# Patient Record
Sex: MALE | Employment: FULL TIME | URBAN - METROPOLITAN AREA
[De-identification: names, ages, dates, MRNs, and addresses within clinical notes are randomized per-mention and may not be internally consistent; named-entity substitution may affect disease eponyms.]

---

## 2017-03-11 ENCOUNTER — HOSPITAL ENCOUNTER (OUTPATIENT)
Dept: MRI IMAGING | Facility: HOSPITAL | Age: 27
Discharge: HOME | End: 2017-03-11
Attending: INTERNAL MEDICINE
Payer: COMMERCIAL

## 2017-03-11 DIAGNOSIS — R52 SHOOTING PAIN: ICD-10-CM

## 2017-03-11 DIAGNOSIS — M54.2 CHRONIC NECK PAIN: ICD-10-CM

## 2017-03-11 DIAGNOSIS — G89.29 CHRONIC NECK PAIN: ICD-10-CM

## 2017-03-11 DIAGNOSIS — M54.10 RADICULOPATHY AFFECTING UPPER EXTREMITY: ICD-10-CM

## 2017-03-11 PROCEDURE — 72141 MRI NECK SPINE W/O DYE: CPT

## 2017-11-08 ENCOUNTER — OFFICE VISIT (OUTPATIENT)
Dept: INTERNAL MEDICINE CLINIC | Facility: CLINIC | Age: 27
End: 2017-11-08

## 2017-11-08 VITALS
HEIGHT: 68 IN | SYSTOLIC BLOOD PRESSURE: 120 MMHG | WEIGHT: 225 LBS | HEART RATE: 63 BPM | DIASTOLIC BLOOD PRESSURE: 70 MMHG | OXYGEN SATURATION: 98 % | BODY MASS INDEX: 34.1 KG/M2

## 2017-11-08 DIAGNOSIS — Z00.00 ROUTINE PHYSICAL EXAMINATION: Primary | ICD-10-CM

## 2017-11-08 PROCEDURE — 90715 TDAP VACCINE 7 YRS/> IM: CPT | Performed by: INTERNAL MEDICINE

## 2017-11-08 PROCEDURE — 90471 IMMUNIZATION ADMIN: CPT | Performed by: INTERNAL MEDICINE

## 2017-11-08 PROCEDURE — 99395 PREV VISIT EST AGE 18-39: CPT | Performed by: INTERNAL MEDICINE

## 2017-11-08 NOTE — PROGRESS NOTES
Ju Mehta is a 28year old male who presents for a complete physical exam.   HPI:       Wt Readings from Last 6 Encounters:  11/08/17 : 225 lb  12/20/16 : 227 lb  07/25/16 : 210 lb  06/07/16 : 214 lb  09/15/15 : 210 lb  06/15/15 : 209 lb    Body mas nourished,in no apparent distress  SKIN: no rashes,no suspicious lesions  HEENT: atraumatic, normocephalic,ears and throat are clear  EYES:PERRLA, EOMI,conjunctiva are clear, no scleral icterus  NECK: supple,no adenopathy,no bruits, no TM  CHEST: no chest

## 2017-11-18 ENCOUNTER — LAB ENCOUNTER (OUTPATIENT)
Dept: LAB | Facility: HOSPITAL | Age: 27
End: 2017-11-18
Attending: INTERNAL MEDICINE
Payer: COMMERCIAL

## 2017-11-18 DIAGNOSIS — Z00.00 ROUTINE PHYSICAL EXAMINATION: ICD-10-CM

## 2017-11-18 PROCEDURE — 80061 LIPID PANEL: CPT

## 2017-11-18 PROCEDURE — 80053 COMPREHEN METABOLIC PANEL: CPT

## 2017-11-18 PROCEDURE — 84443 ASSAY THYROID STIM HORMONE: CPT

## 2017-11-18 PROCEDURE — 87389 HIV-1 AG W/HIV-1&-2 AB AG IA: CPT

## 2017-11-18 PROCEDURE — 36415 COLL VENOUS BLD VENIPUNCTURE: CPT

## 2017-11-18 PROCEDURE — 83036 HEMOGLOBIN GLYCOSYLATED A1C: CPT

## 2017-11-18 PROCEDURE — 85025 COMPLETE CBC W/AUTO DIFF WBC: CPT

## 2018-02-13 ENCOUNTER — TELEPHONE (OUTPATIENT)
Dept: INTERNAL MEDICINE CLINIC | Facility: CLINIC | Age: 28
End: 2018-02-13

## 2018-02-13 NOTE — TELEPHONE ENCOUNTER
Last A1c 11/18/17, asking if you want A1c before his appt this Thursday 2/15/18 - no lab on file. It will be slightly less than 3 months since last A1c.

## 2018-02-14 NOTE — TELEPHONE ENCOUNTER
Left detailed message on pt's voice mail re: pt is overdue for liver lab tests, no need to do A1c now. Pt asked to call back re: why has he scheduled F/U appt--is he having new symptoms?

## 2018-02-14 NOTE — TELEPHONE ENCOUNTER
Pt states he made the appt tomorrow to discuss concerns with elevated ALT and A1C. Pt cancelled appt for tomorrow as he wants to wait for the results of the Liver Enzyme tests to come back. He will call back to reschedule. Informed pt.  A1C is not needed pe

## 2018-02-15 ENCOUNTER — LAB ENCOUNTER (OUTPATIENT)
Dept: LAB | Age: 28
End: 2018-02-15
Attending: INTERNAL MEDICINE
Payer: COMMERCIAL

## 2018-02-15 DIAGNOSIS — R74.8 ELEVATED LIVER ENZYMES: ICD-10-CM

## 2018-02-15 DIAGNOSIS — R69 DIAGNOSIS UNKNOWN: Primary | ICD-10-CM

## 2018-02-15 PROCEDURE — 36415 COLL VENOUS BLD VENIPUNCTURE: CPT

## 2018-02-15 PROCEDURE — 84450 TRANSFERASE (AST) (SGOT): CPT

## 2018-02-15 PROCEDURE — 84460 ALANINE AMINO (ALT) (SGPT): CPT

## 2018-02-15 NOTE — TELEPHONE ENCOUNTER
Pt would like to know if he can have an A1C added to his liver enzyme test, even though it's early. He would just like to see if it's come down slightly since changing his diet and exercise regimen. Is this okay? Thank you.

## 2018-02-15 NOTE — TELEPHONE ENCOUNTER
Patient called again, stating his parents are very concerned about his A1C and wants ot know if Dr. Aylin Rojas would order that lab to [facility]. Please advise patient, thank you.

## 2018-02-16 LAB
ALT SERPL-CCNC: 57 U/L (ref 17–63)
AST SERPL-CCNC: 27 U/L (ref 15–41)

## 2018-03-15 ENCOUNTER — TELEPHONE (OUTPATIENT)
Dept: INTERNAL MEDICINE CLINIC | Facility: CLINIC | Age: 28
End: 2018-03-15

## 2018-03-15 DIAGNOSIS — R73.9 HYPERGLYCEMIA: Primary | ICD-10-CM

## 2018-03-30 ENCOUNTER — APPOINTMENT (OUTPATIENT)
Dept: LAB | Age: 28
End: 2018-03-30
Attending: INTERNAL MEDICINE
Payer: COMMERCIAL

## 2018-03-30 DIAGNOSIS — R73.9 HYPERGLYCEMIA: ICD-10-CM

## 2018-03-30 PROCEDURE — 36415 COLL VENOUS BLD VENIPUNCTURE: CPT

## 2018-03-30 PROCEDURE — 83036 HEMOGLOBIN GLYCOSYLATED A1C: CPT

## 2018-04-04 ENCOUNTER — TELEPHONE (OUTPATIENT)
Dept: INTERNAL MEDICINE CLINIC | Facility: CLINIC | Age: 28
End: 2018-04-04

## 2018-04-04 NOTE — TELEPHONE ENCOUNTER
Spoke with pt. See Lab 3/30/18. Advised per Dr. Philippe Agent that pt needs to reduce carbohydrates in his diet. Pt voiced understanding.

## 2018-04-04 NOTE — TELEPHONE ENCOUNTER
Patient reviewed labs ordered by Dr Gaylene Galeazzi which indicated that he is pre-diabetic. Patient is asking about recommendations for improving this. Please advise.

## 2018-05-11 ENCOUNTER — TELEPHONE (OUTPATIENT)
Dept: INTERNAL MEDICINE CLINIC | Facility: CLINIC | Age: 28
End: 2018-05-11

## 2018-05-11 NOTE — TELEPHONE ENCOUNTER
Patient called, he said that his BP was high last night, he said it was 130's/94 patient said that the 94 stayed high for a while, then finally dropped after the upper number dropped.  Patients hands were very shaky, patient said this happened around 11-7 o'

## 2018-05-11 NOTE — TELEPHONE ENCOUNTER
Called the patient back. He stated that yesterday evening he started to feel shaky and sweaty, which prompted him to take his BP. The patient stated his systolic BP was in the 732 range, and his diastolic BP was around 94.  The patient stated he waited to t

## 2018-05-12 ENCOUNTER — OFFICE VISIT (OUTPATIENT)
Dept: INTERNAL MEDICINE CLINIC | Facility: CLINIC | Age: 28
End: 2018-05-12

## 2018-05-12 VITALS
WEIGHT: 217 LBS | DIASTOLIC BLOOD PRESSURE: 60 MMHG | SYSTOLIC BLOOD PRESSURE: 90 MMHG | HEART RATE: 68 BPM | BODY MASS INDEX: 35.29 KG/M2 | RESPIRATION RATE: 14 BRPM | HEIGHT: 65.75 IN | TEMPERATURE: 98 F | OXYGEN SATURATION: 99 %

## 2018-05-12 DIAGNOSIS — R25.1 TREMULOUSNESS: Primary | ICD-10-CM

## 2018-05-12 DIAGNOSIS — Z71.1 CONCERN ABOUT STD IN MALE WITHOUT DIAGNOSIS: ICD-10-CM

## 2018-05-12 PROCEDURE — 99214 OFFICE O/P EST MOD 30 MIN: CPT | Performed by: INTERNAL MEDICINE

## 2018-05-12 RX ORDER — ELECTROLYTES/DEXTROSE
SOLUTION, ORAL ORAL DAILY
COMMUNITY

## 2018-05-12 NOTE — PROGRESS NOTES
Zay Ortega is a 28year old male.   HPI:   CC: Thursday night  Sweaty and shaky right hand  20 min at the same time 830 pm or 9 pm  BP was 130 range, HR in 90's  Calm down afterward taking shower  Eating helped  Had small sandwich for lunch but did no SpO2 99%   BMI 35.29 kg/m²   GENERAL: well developed, well nourished,in no apparent distress  NECK: supple,no masses, no thyromegaly, no thyroid nodules  LYMPH: no cervical adenopathy  CARDIO: [id number] no S3S4 no murmur, PMI nondisplaced,  no carotid bruit

## 2023-02-16 ENCOUNTER — APPOINTMENT (OUTPATIENT)
Dept: URBAN - METROPOLITAN AREA CLINIC 248 | Age: 33
Setting detail: DERMATOLOGY
End: 2023-02-20

## 2023-02-16 DIAGNOSIS — L40.0 PSORIASIS VULGARIS: ICD-10-CM

## 2023-02-16 PROCEDURE — OTHER ADDITIONAL NOTES: OTHER

## 2023-02-16 PROCEDURE — OTHER PRESCRIPTION MEDICATION MANAGEMENT: OTHER

## 2023-02-16 PROCEDURE — OTHER PRESCRIPTION: OTHER

## 2023-02-16 PROCEDURE — 99203 OFFICE O/P NEW LOW 30 MIN: CPT

## 2023-02-16 PROCEDURE — OTHER COUNSELING: OTHER

## 2023-02-16 PROCEDURE — OTHER MEDICATION COUNSELING: OTHER

## 2023-02-16 RX ORDER — CLOBETASOL PROPIONATE 0.5 MG/G
CREAM TOPICAL BID
Qty: 60 | Refills: 3 | Status: ERX | COMMUNITY
Start: 2023-02-16

## 2023-02-16 RX ORDER — KETOCONAZOLE 20 MG/ML
SHAMPOO, SUSPENSION TOPICAL
Qty: 120 | Refills: 3 | Status: ERX | COMMUNITY
Start: 2023-02-16

## 2023-02-16 RX ORDER — DESONIDE 0.5 MG/G
CREAM TOPICAL
Qty: 60 | Refills: 3 | Status: ERX | COMMUNITY
Start: 2023-02-16

## 2023-02-16 RX ORDER — CALCIPOTRIENE 50 UG/G
AEROSOL, FOAM TOPICAL
Qty: 60 | Refills: 3 | Status: ERX | COMMUNITY
Start: 2023-02-16

## 2023-02-16 ASSESSMENT — LOCATION ZONE DERM
LOCATION ZONE: TRUNK
LOCATION ZONE: SCALP
LOCATION ZONE: NECK
LOCATION ZONE: FACE

## 2023-02-16 ASSESSMENT — LOCATION SIMPLE DESCRIPTION DERM
LOCATION SIMPLE: RIGHT BUTTOCK
LOCATION SIMPLE: RIGHT FOREHEAD
LOCATION SIMPLE: LEFT ANTERIOR NECK
LOCATION SIMPLE: SCALP
LOCATION SIMPLE: POSTERIOR SCALP
LOCATION SIMPLE: LEFT UPPER BACK

## 2023-02-16 ASSESSMENT — LOCATION DETAILED DESCRIPTION DERM
LOCATION DETAILED: LEFT CENTRAL FRONTAL SCALP
LOCATION DETAILED: RIGHT CENTRAL FRONTAL SCALP
LOCATION DETAILED: LEFT LATERAL UPPER BACK
LOCATION DETAILED: RIGHT OCCIPITAL SCALP
LOCATION DETAILED: RIGHT SUPERIOR FOREHEAD
LOCATION DETAILED: RIGHT BUTTOCK
LOCATION DETAILED: LEFT INFERIOR LATERAL NECK
LOCATION DETAILED: LEFT OCCIPITAL SCALP

## 2023-02-16 ASSESSMENT — BSA PSORIASIS: % BODY COVERED IN PSORIASIS: 6

## 2023-02-16 NOTE — PROCEDURE: PRESCRIPTION MEDICATION MANAGEMENT
Render In Strict Bullet Format?: No
Initiate Treatment: ketoconazole 2 % shampoo \\nQuantity: 120.0 ml  Days Supply: 30\\nSig: Wash into scalp every other day when you wash your hair\\n\\ncalcipotriene 0.005 % topical foam \\nQuantity: 60.0 g  Days Supply: 30\\nSig: Apply 1-2 times daily, take weekends off. Alternate with clobetasol\\n\\ndesonide 0.05 % topical cream \\nQuantity: 60.0 g  Days Supply: 30\\nSig: Apply to affected areas on groin 1-2 times daily, take weekends off\\n\\nclobetasol 0.05 % topical cream BID\\nQuantity: 60.0 g  Days Supply: 30\\nSig: Apply twice daily to affected areas take  weekends off
Detail Level: Zone

## 2023-02-16 NOTE — PROCEDURE: ADDITIONAL NOTES
Detail Level: Simple
Additional Notes: Pt provided with skyrizi informational pamphlet, will consider starting if topical regimen doesn’t work for him
Render Risk Assessment In Note?: no

## 2023-02-16 NOTE — PROCEDURE: MEDICATION COUNSELING
Xelivaz Pregnancy And Lactation Text: This medication is Pregnancy Category D and is not considered safe during pregnancy.  The risk during breast feeding is also uncertain.

## 2023-02-23 ENCOUNTER — RX ONLY (RX ONLY)
Age: 33
End: 2023-02-23

## 2023-02-23 RX ORDER — CALCIPOTRIENE 0.05 MG/G
CREAM TOPICAL
Qty: 120 | Refills: 3 | Status: ERX | COMMUNITY
Start: 2023-02-23

## 2023-07-20 ENCOUNTER — APPOINTMENT (OUTPATIENT)
Dept: URBAN - METROPOLITAN AREA CLINIC 248 | Age: 33
Setting detail: DERMATOLOGY
End: 2023-07-20

## 2023-07-20 DIAGNOSIS — L40.0 PSORIASIS VULGARIS: ICD-10-CM

## 2023-07-20 PROCEDURE — 99213 OFFICE O/P EST LOW 20 MIN: CPT

## 2023-07-20 PROCEDURE — OTHER PRESCRIPTION: OTHER

## 2023-07-20 PROCEDURE — OTHER COUNSELING: OTHER

## 2023-07-20 PROCEDURE — OTHER PRESCRIPTION MEDICATION MANAGEMENT: OTHER

## 2023-07-20 PROCEDURE — OTHER ADDITIONAL NOTES: OTHER

## 2023-07-20 RX ORDER — CLOBETASOL PROPIONATE 0.05 G/100ML
SHAMPOO TOPICAL
Qty: 118 | Refills: 3 | Status: ERX | COMMUNITY
Start: 2023-07-20

## 2023-07-20 NOTE — PROCEDURE: PRESCRIPTION MEDICATION MANAGEMENT
Detail Level: Zone
Render In Strict Bullet Format?: No
Discontinue Regimen: calcipotriene .005 topical foam
Continue Regimen: Ketoconazole shampoo 2% shampoo. desonide .05 cream, and clobetasol.05% cream
Initiate Treatment: Clobex 0.05 % shampoo \\nSig: Apply on scalp daily.

## 2023-07-20 NOTE — PROCEDURE: ADDITIONAL NOTES
Render Risk Assessment In Note?: no
Additional Notes: Declines skyrizi at this time
Detail Level: Simple
Additional Notes: Referred to Dr. Fernandez Musa for joint pain

## 2023-07-20 NOTE — HPI: FOLLOW-UP
During The Previous Visit, The Patient ....: Was prescribed ketoconazole 2% shampoo, calcipotriene .005 topical foam, desonide .05 cream, and clobetasol.05% cream

## 2023-10-31 ENCOUNTER — APPOINTMENT (OUTPATIENT)
Dept: URBAN - METROPOLITAN AREA CLINIC 248 | Age: 33
Setting detail: DERMATOLOGY
End: 2023-10-31

## 2023-10-31 DIAGNOSIS — L40.0 PSORIASIS VULGARIS: ICD-10-CM

## 2023-10-31 PROCEDURE — OTHER COUNSELING: OTHER

## 2023-10-31 PROCEDURE — OTHER PRESCRIPTION MEDICATION MANAGEMENT: OTHER

## 2023-10-31 PROCEDURE — OTHER PRESCRIPTION: OTHER

## 2023-10-31 PROCEDURE — OTHER ADDITIONAL NOTES: OTHER

## 2023-10-31 PROCEDURE — 99213 OFFICE O/P EST LOW 20 MIN: CPT

## 2023-10-31 RX ORDER — FLUOCINOLONE ACETONIDE 0.1 MG/ML
SOLUTION TOPICAL
Qty: 90 | Refills: 3 | Status: ERX | COMMUNITY
Start: 2023-10-31

## 2023-10-31 ASSESSMENT — LOCATION DETAILED DESCRIPTION DERM: LOCATION DETAILED: EPIGASTRIC SKIN

## 2023-10-31 ASSESSMENT — LOCATION ZONE DERM: LOCATION ZONE: TRUNK

## 2023-10-31 ASSESSMENT — LOCATION SIMPLE DESCRIPTION DERM: LOCATION SIMPLE: ABDOMEN

## 2023-10-31 NOTE — PROCEDURE: ADDITIONAL NOTES
Render Risk Assessment In Note?: no
Additional Notes: Declines skyrizi at this time
Detail Level: Simple
Additional Notes: Saw Dr. Fernandez Musa for joint pain, to consider biologic therapy

## 2024-02-15 ENCOUNTER — RX ONLY (RX ONLY)
Age: 34
End: 2024-02-15

## 2024-02-15 ENCOUNTER — APPOINTMENT (OUTPATIENT)
Dept: URBAN - METROPOLITAN AREA CLINIC 248 | Age: 34
Setting detail: DERMATOLOGY
End: 2024-02-16

## 2024-02-15 DIAGNOSIS — L40.0 PSORIASIS VULGARIS: ICD-10-CM

## 2024-02-15 PROCEDURE — OTHER PRESCRIPTION MEDICATION MANAGEMENT: OTHER

## 2024-02-15 PROCEDURE — OTHER ADDITIONAL NOTES: OTHER

## 2024-02-15 PROCEDURE — OTHER PRESCRIPTION: OTHER

## 2024-02-15 PROCEDURE — OTHER COUNSELING: OTHER

## 2024-02-15 PROCEDURE — 99213 OFFICE O/P EST LOW 20 MIN: CPT

## 2024-02-15 RX ORDER — DESONIDE 0.5 MG/G
CREAM TOPICAL
Qty: 60 | Refills: 3 | Status: ERX | COMMUNITY
Start: 2024-02-15

## 2024-02-15 RX ORDER — CLOBETASOL PROPIONATE 0.5 MG/G
CREAM TOPICAL BID
Qty: 60 | Refills: 3 | Status: ERX | COMMUNITY
Start: 2024-02-15

## 2024-02-15 RX ORDER — CLOBETASOL PROPIONATE 0.5 MG/ML
SOLUTION TOPICAL
Qty: 50 | Refills: 3 | Status: ERX | COMMUNITY
Start: 2024-02-15

## 2024-02-15 RX ORDER — CALCIPOTRIENE 0.05 MG/G
CREAM TOPICAL
Qty: 120 | Refills: 3 | Status: ERX | COMMUNITY
Start: 2024-02-15

## 2024-02-15 NOTE — PROCEDURE: ADDITIONAL NOTES
Additional Notes: We discussed that psoriatic arthritis can be progressive and damages may be irreversible.  As such I formally recommend a systemic therapy that would address joints.  For now, he is not interested but voiced understanding.
Detail Level: Simple
Render Risk Assessment In Note?: no

## 2024-02-15 NOTE — PROCEDURE: PRESCRIPTION MEDICATION MANAGEMENT
Detail Level: Zone
Render In Strict Bullet Format?: No
Discontinue Regimen: calcipotriene .005 topical foam\\n\\nClobex shampoo
Continue Regimen: Ketoconazole shampoo 2% shampoo. desonide .05 cream, and clobetasol.05% cream\\n\\nSynalar 0.01 % topical solution \\nSig: Apply twice daily to scalp with itch as needed
Initiate Treatment: clobetasol 0.05 % scalp solution \\nSi application daily as needed with flares

## 2024-10-15 ENCOUNTER — APPOINTMENT (OUTPATIENT)
Dept: URBAN - METROPOLITAN AREA CLINIC 248 | Age: 34
Setting detail: DERMATOLOGY
End: 2024-10-17

## 2024-10-15 DIAGNOSIS — L40.0 PSORIASIS VULGARIS: ICD-10-CM

## 2024-10-15 PROCEDURE — OTHER PRESCRIPTION: OTHER

## 2024-10-15 PROCEDURE — 99213 OFFICE O/P EST LOW 20 MIN: CPT

## 2024-10-15 PROCEDURE — OTHER ADDITIONAL NOTES: OTHER

## 2024-10-15 PROCEDURE — OTHER COUNSELING: OTHER

## 2024-10-15 PROCEDURE — OTHER PRESCRIPTION MEDICATION MANAGEMENT: OTHER

## 2024-10-15 PROCEDURE — OTHER MIPS QUALITY: OTHER

## 2024-10-15 RX ORDER — HYDROCORTISONE 25 MG/G
OINTMENT TOPICAL
Qty: 28.35 | Refills: 2 | Status: ERX | COMMUNITY
Start: 2024-10-15

## 2024-10-15 ASSESSMENT — LOCATION SIMPLE DESCRIPTION DERM
LOCATION SIMPLE: RIGHT SCALP
LOCATION SIMPLE: RIGHT UPPER ARM
LOCATION SIMPLE: RIGHT BUTTOCK
LOCATION SIMPLE: LEFT THIGH
LOCATION SIMPLE: RIGHT THIGH
LOCATION SIMPLE: LEFT UPPER ARM
LOCATION SIMPLE: LEFT BUTTOCK

## 2024-10-15 ASSESSMENT — LOCATION ZONE DERM
LOCATION ZONE: ARM
LOCATION ZONE: LEG
LOCATION ZONE: SCALP
LOCATION ZONE: TRUNK

## 2024-10-15 ASSESSMENT — LOCATION DETAILED DESCRIPTION DERM
LOCATION DETAILED: LEFT BUTTOCK
LOCATION DETAILED: RIGHT BUTTOCK
LOCATION DETAILED: LEFT DISTAL POSTERIOR UPPER ARM
LOCATION DETAILED: RIGHT DISTAL POSTERIOR UPPER ARM
LOCATION DETAILED: RIGHT MEDIAL FRONTAL SCALP
LOCATION DETAILED: RIGHT ANTERIOR PROXIMAL THIGH
LOCATION DETAILED: LEFT ANTERIOR PROXIMAL THIGH

## 2024-10-15 ASSESSMENT — BSA PSORIASIS: % BODY COVERED IN PSORIASIS: 20

## 2024-10-15 NOTE — PROCEDURE: PRESCRIPTION MEDICATION MANAGEMENT
Detail Level: Zone
Render In Strict Bullet Format?: No
Discontinue Regimen: calcipotriene .005 topical foam\\n\\nClobex shampoo
Continue Regimen: Ketoconazole shampoo 2% shampoo. desonide .05 cream, and clobetasol.05% cream\\n\\nSynalar 0.01 % topical solution \\nSig: Apply twice daily to scalp with itch as needed\\n\\nclobetasol 0.05 % scalp solution \\nSi application daily as needed with flares
Initiate Treatment: hydrocortisone 2.5 % topical ointment Every 4 days\\nSig: Apply to AA every other day to upper thigh

## 2024-10-15 NOTE — PROCEDURE: ADDITIONAL NOTES
Additional Notes: We discussed that psoriatic arthritis can be progressive and damages may be irreversible.  As such I formally recommend a systemic therapy that would address joints.  For now, he is not interested but voiced understanding. Patient was given information for both skyrizi and tremfya
Detail Level: Simple
Render Risk Assessment In Note?: no

## 2025-01-22 NOTE — PROCEDURE: MEDICATION COUNSELING
Chief Complaint   Patient presents with    Surgical Followup    Office Visit       History of Present Illness:     Status post phaco with posterior chamber intraocular lens implant left eye 12/17/2024.  The patient states vision out of the left eye is pretty decent.  The vision out of the left eye might change a little throughout the day.  The patient does need a new glasses prescription today.  He does see floaters in the left eye.     No eye pain, redness, irritation, dryness, burning, tearing, itching, discharge, photophobia, photopsia,      No diplopia, numbness, tingling, seizures, lightheadedness, dizziness, headache.    The patient requests a refraction and is aware of the clinic fee.     Eye Medications:  None    Past Ocular History:   Age related nuclear cataract right eye, toric pseudophakia left eye 12/17/2024  Left retinal detachment, post PPV, AFX, Gas 10/4/2023 (Dr. Kim)  Bilateral RK 1990s    Family Ocular History:  Reviewed; see chart    Medications, Allergies: Reviewed; see chart    Past Medical, Social, and Family History: Reviewed; see chart    Examination: For full examination and ancillary testing details, see chart    Assessment:    1. Pseudophakia of left eye    2. Myopia with astigmatism and presbyopia, left    3. History of radial keratotomy        Plan:  Doing well post cataract extraction and intraocular lens placement, with resolved inflammation off drops, and clear and well positioned intraocular lens. High powered toric IOL has greatly reduced astigmatism--good refractive result. Refracted as noted in Epic. A glasses prescription was given to the patient on request. We discussed retinal detachment and retinal tear signs and symptoms, and he knows to call for any new concerns.       Follow-up:  Return for follow up as needed (go for a dilated eye exam with your OD in about 6 months).     Ilumya Pregnancy And Lactation Text: The risk during pregnancy and breastfeeding is uncertain with this medication.

## 2025-01-28 ENCOUNTER — APPOINTMENT (OUTPATIENT)
Dept: URBAN - METROPOLITAN AREA CLINIC 248 | Age: 35
Setting detail: DERMATOLOGY
End: 2025-01-30

## 2025-01-28 DIAGNOSIS — L40.0 PSORIASIS VULGARIS: ICD-10-CM

## 2025-01-28 PROCEDURE — OTHER COUNSELING: OTHER

## 2025-01-28 PROCEDURE — OTHER ADDITIONAL NOTES: OTHER

## 2025-01-28 PROCEDURE — OTHER SKYRIZI INITIATION: OTHER

## 2025-01-28 PROCEDURE — OTHER ORDER TESTS: OTHER

## 2025-01-28 PROCEDURE — 99214 OFFICE O/P EST MOD 30 MIN: CPT

## 2025-01-28 PROCEDURE — OTHER MIPS QUALITY: OTHER

## 2025-01-28 PROCEDURE — OTHER PRESCRIPTION MEDICATION MANAGEMENT: OTHER

## 2025-01-28 ASSESSMENT — LOCATION DETAILED DESCRIPTION DERM
LOCATION DETAILED: RIGHT ANTERIOR PROXIMAL THIGH
LOCATION DETAILED: LEFT DISTAL POSTERIOR UPPER ARM
LOCATION DETAILED: LEFT ANTERIOR PROXIMAL THIGH
LOCATION DETAILED: RIGHT BUTTOCK
LOCATION DETAILED: RIGHT DISTAL POSTERIOR UPPER ARM
LOCATION DETAILED: LEFT BUTTOCK
LOCATION DETAILED: RIGHT MEDIAL FRONTAL SCALP

## 2025-01-28 ASSESSMENT — LOCATION SIMPLE DESCRIPTION DERM
LOCATION SIMPLE: RIGHT SCALP
LOCATION SIMPLE: LEFT UPPER ARM
LOCATION SIMPLE: RIGHT THIGH
LOCATION SIMPLE: LEFT THIGH
LOCATION SIMPLE: RIGHT UPPER ARM
LOCATION SIMPLE: RIGHT BUTTOCK
LOCATION SIMPLE: LEFT BUTTOCK

## 2025-01-28 ASSESSMENT — LOCATION ZONE DERM
LOCATION ZONE: LEG
LOCATION ZONE: SCALP
LOCATION ZONE: TRUNK
LOCATION ZONE: ARM

## 2025-01-28 NOTE — PROCEDURE: SKYRIZI INITIATION
Pregnancy And Lactation Warning Text: The risk during pregnancy and breastfeeding is uncertain with this medication.
Diagnosis (Required): Psoriasis
Is Cyclosporine Contraindicated?: No
Skyrizi Monitoring Guidelines: A yearly test for tuberculosis is required while taking Skyrizi.
Detail Level: Zone
Include Weight Question: Yes - Pounds
Skyrizi Dosing: 150 mg SC week 0 and week 4 then every 12 weeks thereafter

## 2025-01-28 NOTE — PROCEDURE: MIPS QUALITY
Quality 431: Preventive Care And Screening: Unhealthy Alcohol Use - Screening: Patient not screened for unhealthy alcohol use using a systematic screening method
Quality 394a: Meningococcal Immunizations For Adolescents: Patient had one dose of meningococcal vaccine (serogroups A, C, W, Y) on or between the patient's 11th and 13th birthdays.
Detail Level: Detailed
Quality 226: Preventive Care And Screening: Tobacco Use: Screening And Cessation Intervention: Patient screened for tobacco use and is an ex/non-smoker
Quality 394b: Td/Tdap Immunizations For Adolescents: Patient had one tetanus, diphtheria toxoids and acellular pertussis vaccine (Tdap) on or between the patient's 10th and 13th birthdays.

## 2025-01-28 NOTE — PROCEDURE: PRESCRIPTION MEDICATION MANAGEMENT
Detail Level: Zone
Render In Strict Bullet Format?: No
Discontinue Regimen: calcipotriene .005 topical foam\\n\\nClobex shampoo
Continue Regimen: Ketoconazole shampoo 2% shampoo. desonide .05 cream, and clobetasol.05% cream\\n\\nSynalar 0.01 % topical solution \\nSig: Apply twice daily to scalp with itch as needed\\n\\nclobetasol 0.05 % scalp solution \\nSi application daily as needed with flares\\n\\nhydrocortisone 2.5 % topical ointment Every 4 days\\nSig: Apply to AA every other day to upper thigh

## 2025-01-28 NOTE — PROCEDURE: ORDER TESTS
Expected Date Of Service: 01/28/2025
Billing Type: Third-Party Bill
Performing Laboratory: 0
Bill For Surgical Tray: no

## 2025-01-28 NOTE — PROCEDURE: ADDITIONAL NOTES
Additional Notes: We discussed that psoriatic arthritis can be progressive and damages may be irreversible.  As such I formally recommend a systemic therapy that would address joints. Raoul paperwork completed and signed today and faxed to [facility].
Detail Level: Simple
Render Risk Assessment In Note?: no

## 2025-05-06 ENCOUNTER — APPOINTMENT (OUTPATIENT)
Dept: URBAN - METROPOLITAN AREA CLINIC 248 | Age: 35
Setting detail: DERMATOLOGY
End: 2025-05-08

## 2025-05-06 ENCOUNTER — RX ONLY (RX ONLY)
Age: 35
End: 2025-05-06

## 2025-05-06 DIAGNOSIS — L40.0 PSORIASIS VULGARIS: ICD-10-CM

## 2025-05-06 PROCEDURE — OTHER SKYRIZI INJECTION: OTHER

## 2025-05-06 PROCEDURE — 96372 THER/PROPH/DIAG INJ SC/IM: CPT

## 2025-05-06 PROCEDURE — OTHER COUNSELING: OTHER

## 2025-05-06 PROCEDURE — OTHER MIPS QUALITY: OTHER

## 2025-05-06 RX ORDER — DESONIDE 0.5 MG/G
CREAM TOPICAL
Qty: 60 | Refills: 3 | Status: ERX

## 2025-05-06 RX ORDER — CLOBETASOL PROPIONATE 0.5 MG/G
CREAM TOPICAL BID
Qty: 60 | Refills: 3 | Status: ERX

## 2025-05-06 RX ORDER — CALCIPOTRIENE 0.05 MG/G
CREAM TOPICAL
Qty: 120 | Refills: 3 | Status: ERX | COMMUNITY
Start: 2025-05-06

## 2025-05-06 ASSESSMENT — LOCATION SIMPLE DESCRIPTION DERM
LOCATION SIMPLE: LEFT THIGH
LOCATION SIMPLE: RIGHT SCALP
LOCATION SIMPLE: ABDOMEN
LOCATION SIMPLE: RIGHT UPPER ARM
LOCATION SIMPLE: LEFT BUTTOCK
LOCATION SIMPLE: LEFT UPPER ARM
LOCATION SIMPLE: RIGHT THIGH
LOCATION SIMPLE: RIGHT BUTTOCK

## 2025-05-06 ASSESSMENT — LOCATION DETAILED DESCRIPTION DERM
LOCATION DETAILED: RIGHT BUTTOCK
LOCATION DETAILED: RIGHT DISTAL POSTERIOR UPPER ARM
LOCATION DETAILED: LEFT BUTTOCK
LOCATION DETAILED: RIGHT MEDIAL FRONTAL SCALP
LOCATION DETAILED: LEFT ANTERIOR PROXIMAL THIGH
LOCATION DETAILED: PERIUMBILICAL SKIN
LOCATION DETAILED: RIGHT ANTERIOR PROXIMAL THIGH
LOCATION DETAILED: LEFT DISTAL POSTERIOR UPPER ARM

## 2025-05-06 ASSESSMENT — LOCATION ZONE DERM
LOCATION ZONE: ARM
LOCATION ZONE: LEG
LOCATION ZONE: SCALP
LOCATION ZONE: TRUNK